# Patient Record
Sex: MALE | URBAN - METROPOLITAN AREA
[De-identification: names, ages, dates, MRNs, and addresses within clinical notes are randomized per-mention and may not be internally consistent; named-entity substitution may affect disease eponyms.]

---

## 2022-12-21 ENCOUNTER — OFFICE (OUTPATIENT)
Dept: URBAN - METROPOLITAN AREA CLINIC 6 | Facility: CLINIC | Age: 52
Setting detail: OPHTHALMOLOGY
End: 2022-12-21
Payer: COMMERCIAL

## 2022-12-21 DIAGNOSIS — H01.005: ICD-10-CM

## 2022-12-21 DIAGNOSIS — H01.002: ICD-10-CM

## 2022-12-21 DIAGNOSIS — H01.001: ICD-10-CM

## 2022-12-21 DIAGNOSIS — H25.13: ICD-10-CM

## 2022-12-21 DIAGNOSIS — H52.03: ICD-10-CM

## 2022-12-21 DIAGNOSIS — H01.004: ICD-10-CM

## 2022-12-21 PROCEDURE — 92014 COMPRE OPH EXAM EST PT 1/>: CPT | Performed by: OPHTHALMOLOGY

## 2022-12-21 PROCEDURE — 92015 DETERMINE REFRACTIVE STATE: CPT | Performed by: OPHTHALMOLOGY

## 2022-12-21 ASSESSMENT — KERATOMETRY
OD_K2POWER_DIOPTERS: 44.00
OS_K1POWER_DIOPTERS: 42.00
OD_K1POWER_DIOPTERS: 40.00
OD_AXISANGLE_DEGREES: 002
OS_K2POWER_DIOPTERS: 44.50
OS_AXISANGLE_DEGREES: 001

## 2022-12-21 ASSESSMENT — REFRACTION_MANIFEST
OD_CYLINDER: -0.25
OD_AXIS: 100
OS_VA1: 20/25
OS_SPHERE: +2.00
OD_SPHERE: PLANO
OU_VA: 20/20-1
OD_VA1: 20/25
OS_CYLINDER: -2.50
OS_AXIS: 095

## 2022-12-21 ASSESSMENT — LID EXAM ASSESSMENTS
OD_BLEPHARITIS: RLL RUL
OS_BLEPHARITIS: LLL LUL

## 2022-12-21 ASSESSMENT — SPHEQUIV_DERIVED
OS_SPHEQUIV: 0.75
OS_SPHEQUIV: 1
OD_SPHEQUIV: -0.125

## 2022-12-21 ASSESSMENT — REFRACTION_CURRENTRX
OS_OVR_VA: 20/
OD_CYLINDER: -2.25
OD_SPHERE: +1.50
OS_VPRISM_DIRECTION: SV
OS_SPHERE: +1.50
OS_AXIS: 81
OD_VPRISM_DIRECTION: SV
OD_OVR_VA: 20/
OD_AXIS: 65
OS_CYLINDER: -2.50

## 2022-12-21 ASSESSMENT — AXIALLENGTH_DERIVED
OS_AL: 23.2978
OS_AL: 23.3932
OD_AL: 24.2072

## 2022-12-21 ASSESSMENT — REFRACTION_AUTOREFRACTION
OD_SPHERE: 0.00
OS_SPHERE: +2.50
OS_AXIS: 093
OD_AXIS: 100
OD_CYLINDER: -0.25
OS_CYLINDER: -3.00

## 2022-12-21 ASSESSMENT — CONFRONTATIONAL VISUAL FIELD TEST (CVF)
OD_FINDINGS: FULL
OS_FINDINGS: FULL

## 2022-12-21 ASSESSMENT — TONOMETRY
OS_IOP_MMHG: 17
OD_IOP_MMHG: 17

## 2022-12-21 ASSESSMENT — VISUAL ACUITY
OS_BCVA: 20/25-
OD_BCVA: 20/30-

## 2023-10-24 ENCOUNTER — NON-APPOINTMENT (OUTPATIENT)
Age: 53
End: 2023-10-24

## 2023-11-13 ENCOUNTER — NON-APPOINTMENT (OUTPATIENT)
Age: 53
End: 2023-11-13

## 2023-12-08 PROBLEM — Z00.00 ENCOUNTER FOR PREVENTIVE HEALTH EXAMINATION: Status: ACTIVE | Noted: 2023-12-08

## 2023-12-14 ENCOUNTER — NON-APPOINTMENT (OUTPATIENT)
Age: 53
End: 2023-12-14

## 2023-12-14 ENCOUNTER — APPOINTMENT (OUTPATIENT)
Dept: CARDIOLOGY | Facility: CLINIC | Age: 53
End: 2023-12-14
Payer: COMMERCIAL

## 2023-12-14 VITALS
WEIGHT: 193 LBS | HEART RATE: 52 BPM | SYSTOLIC BLOOD PRESSURE: 134 MMHG | BODY MASS INDEX: 28.58 KG/M2 | HEIGHT: 69 IN | DIASTOLIC BLOOD PRESSURE: 80 MMHG | OXYGEN SATURATION: 98 % | RESPIRATION RATE: 16 BRPM

## 2023-12-14 DIAGNOSIS — Z87.19 PERSONAL HISTORY OF OTHER DISEASES OF THE DIGESTIVE SYSTEM: ICD-10-CM

## 2023-12-14 DIAGNOSIS — Z78.9 OTHER SPECIFIED HEALTH STATUS: ICD-10-CM

## 2023-12-14 DIAGNOSIS — Z87.891 PERSONAL HISTORY OF NICOTINE DEPENDENCE: ICD-10-CM

## 2023-12-14 DIAGNOSIS — R00.0 TACHYCARDIA, UNSPECIFIED: ICD-10-CM

## 2023-12-14 PROCEDURE — 99204 OFFICE O/P NEW MOD 45 MIN: CPT | Mod: 25

## 2023-12-14 PROCEDURE — 93000 ELECTROCARDIOGRAM COMPLETE: CPT

## 2023-12-14 NOTE — ASSESSMENT
[FreeTextEntry1] : 53M former smoker, who comes to the office to establish cardiovascular care.  Patient reports that for the past few months he has experienced palpitations at night time, these episodes last for about a couple of mins and improved on their own, he denies relationship with energy drinks and they are not associated with chest pain/SOB. His palpitations occurred about 2-3 times per week.  He denies syncope, no HF symptoms.    #Palpitations MCOT X 2weeks TTE  #Hx of smoking  will proceed with KATERIN/PVR to screen for PVD  #CAD screening  CAC score  RTC post testing  I appreciate the opportunity of working with you in the care of DENNIS SULLIVAN . If I may be of additional assistance, please do not hesitate to contact me.

## 2023-12-14 NOTE — HISTORY OF PRESENT ILLNESS
[FreeTextEntry1] : 53M former smoker, who comes to the office to establish cardiovascular care.  Patient reports that for the past few months he has experienced palpitations at night time, these episodes last for about a couple of mins and improved on their own, he denies relationship with energy drinks and they are not associated with chest pain/SOB. His palpitations occurred about 2-3 times per week.  He denies syncope, no HF symptoms.

## 2024-01-26 ENCOUNTER — APPOINTMENT (OUTPATIENT)
Dept: CARDIOLOGY | Facility: CLINIC | Age: 54
End: 2024-01-26
Payer: COMMERCIAL

## 2024-01-26 PROCEDURE — 93306 TTE W/DOPPLER COMPLETE: CPT

## 2024-01-26 PROCEDURE — 93923 UPR/LXTR ART STDY 3+ LVLS: CPT

## 2024-02-09 ENCOUNTER — APPOINTMENT (OUTPATIENT)
Dept: CARDIOLOGY | Facility: CLINIC | Age: 54
End: 2024-02-09
Payer: COMMERCIAL

## 2024-02-09 VITALS
RESPIRATION RATE: 16 BRPM | BODY MASS INDEX: 27.99 KG/M2 | WEIGHT: 189 LBS | SYSTOLIC BLOOD PRESSURE: 142 MMHG | DIASTOLIC BLOOD PRESSURE: 69 MMHG | HEIGHT: 69 IN | HEART RATE: 56 BPM

## 2024-02-09 DIAGNOSIS — Z87.891 PERSONAL HISTORY OF NICOTINE DEPENDENCE: ICD-10-CM

## 2024-02-09 PROCEDURE — 99213 OFFICE O/P EST LOW 20 MIN: CPT

## 2024-02-09 NOTE — CARDIOLOGY SUMMARY
[de-identified] : 12/14/23: Sinus bradycardia. [de-identified] :  HERB 12/26/23 - 1/8/24 No AFib, No SVT, no heart blocks, No Pauses, no VT,  no PAC/PVC [de-identified] : TTE 1/26/24: 1. Normal biventricular systolic function. 2. The right atrium is dilated in size. 3. No significant valvular disease. [de-identified] : Normal KATERIN/ PVR (1/26/24)

## 2024-02-09 NOTE — HISTORY OF PRESENT ILLNESS
[FreeTextEntry1] : 53M former smoker, who comes to the office to establish cardiovascular care.  On last visit patient was complaining of palpitations, he was ordered, MCOT. TTE.  KATERIN+ CAC score ordered for prevention purposes. Patient denies chest pain, no palpitations, no ALCANTARA, no PND, no orthopnea, no leg edema,  no claudication, no syncope.

## 2024-02-09 NOTE — ASSESSMENT
[FreeTextEntry1] : 53M former smoker, who comes to the office to establish cardiovascular care.  On last visit patient was complaining of palpitations, he was ordered, MCOT. TTE.  KATERIN+ CAC score ordered for prevention purposes. Patient denies chest pain, no palpitations, no ALCANTARA, no PND, no orthopnea, no leg edema,  no claudication, no syncope.   #Palpitations MCOT 12/26/23 - 1/8/24 No AFib, No SVT, no heart blocks, No Pauses, no VT,  no PAC/PVC unremarkable TTE  #Hx of smoking  normal KATERIN/PVR   #CAD screening  CAC score denied by insurance, will proceed with exercise stress test.   RTC post testing

## 2024-03-20 ENCOUNTER — APPOINTMENT (OUTPATIENT)
Dept: CARDIOLOGY | Facility: CLINIC | Age: 54
End: 2024-03-20
Payer: COMMERCIAL

## 2024-03-20 PROCEDURE — 93015 CV STRESS TEST SUPVJ I&R: CPT

## 2024-04-25 ENCOUNTER — APPOINTMENT (OUTPATIENT)
Dept: CARDIOLOGY | Facility: CLINIC | Age: 54
End: 2024-04-25
Payer: COMMERCIAL

## 2024-04-25 VITALS
OXYGEN SATURATION: 93 % | BODY MASS INDEX: 28.73 KG/M2 | SYSTOLIC BLOOD PRESSURE: 110 MMHG | HEIGHT: 69 IN | WEIGHT: 194 LBS | DIASTOLIC BLOOD PRESSURE: 60 MMHG | RESPIRATION RATE: 16 BRPM | HEART RATE: 63 BPM

## 2024-04-25 DIAGNOSIS — Z13.6 ENCOUNTER FOR SCREENING FOR CARDIOVASCULAR DISORDERS: ICD-10-CM

## 2024-04-25 DIAGNOSIS — R00.2 PALPITATIONS: ICD-10-CM

## 2024-04-25 PROCEDURE — 99213 OFFICE O/P EST LOW 20 MIN: CPT

## 2024-08-30 ENCOUNTER — APPOINTMENT (OUTPATIENT)
Dept: ORTHOPEDIC SURGERY | Facility: CLINIC | Age: 54
End: 2024-08-30

## 2024-08-31 ENCOUNTER — NON-APPOINTMENT (OUTPATIENT)
Age: 54
End: 2024-08-31

## 2024-09-09 ENCOUNTER — APPOINTMENT (OUTPATIENT)
Dept: ORTHOPEDIC SURGERY | Facility: CLINIC | Age: 54
End: 2024-09-09
Payer: COMMERCIAL

## 2024-09-09 VITALS
HEIGHT: 69 IN | WEIGHT: 194 LBS | BODY MASS INDEX: 28.73 KG/M2 | DIASTOLIC BLOOD PRESSURE: 73 MMHG | SYSTOLIC BLOOD PRESSURE: 166 MMHG

## 2024-09-09 DIAGNOSIS — M47.816 SPONDYLOSIS W/OUT MYELOPATHY OR RADICULOPATHY, LUMBAR REGION: ICD-10-CM

## 2024-09-09 DIAGNOSIS — M54.16 RADICULOPATHY, LUMBAR REGION: ICD-10-CM

## 2024-09-09 DIAGNOSIS — M16.12 UNILATERAL PRIMARY OSTEOARTHRITIS, LEFT HIP: ICD-10-CM

## 2024-09-09 PROCEDURE — 99204 OFFICE O/P NEW MOD 45 MIN: CPT

## 2024-09-09 PROCEDURE — 72110 X-RAY EXAM L-2 SPINE 4/>VWS: CPT

## 2024-09-09 RX ORDER — DICLOFENAC SODIUM 75 MG/1
75 TABLET, DELAYED RELEASE ORAL
Qty: 60 | Refills: 1 | Status: ACTIVE | COMMUNITY
Start: 2024-09-09 | End: 1900-01-01

## 2024-09-09 RX ORDER — GABAPENTIN 300 MG/1
300 CAPSULE ORAL
Qty: 30 | Refills: 1 | Status: ACTIVE | COMMUNITY
Start: 2024-09-09 | End: 1900-01-01

## 2024-09-10 RX ORDER — METHYLPREDNISOLONE 4 MG/1
4 TABLET ORAL
Qty: 1 | Refills: 1 | Status: ACTIVE | COMMUNITY
Start: 2024-09-10 | End: 1900-01-01

## 2024-09-10 NOTE — DISCUSSION/SUMMARY
[de-identified] : 54-year-old male with left hip osteoarthritis versus left lower extremity lumbar radiculopathy  Gabapentin 300 mg at nighttime Diclofenac 75 mg twice daily as needed for pain control Physical therapy [facility] service referral was placed to assist with scheduling and location I would like to see him back in 8 weeks if his symptoms have not improved we will consider more advanced imaging of his hip and/or lumbar spine

## 2024-09-10 NOTE — HISTORY OF PRESENT ILLNESS
[de-identified] :    - Summary: 54-year-old male, Shoaib, presents today with a complaint of low back pain and Left thigh/leg pain happening for the past couple of months.   - Chief Complaint (CC): Shoaib main concern today is low back pain that has been troubling him for the past couple of months.   - History of Present Illness: Shoaib, a 54-year-old male, presents with a primary complaint of low back pain over the past couple of months. The pain intensifies when lifting items, leading to a shooting pain down the left leg. He reports the pain is mostly on his left side and radiates down his left thigh, anterior thigh, accompanied by some numbness and tingling in anterior thigh.  He is also having some swelling of his LLE, recent doppler was negative for blood clot, having tightness and pain in back of his left calf. This discomfort sometimes makes walking and movements difficult, making him feel as if he's dragging his leg. The patient mentions a history of back problems and a recent increase in the severity of the pain. Notably, he has not undergone any recent medical interventions and does not currently take any medications.

## 2024-09-10 NOTE — HISTORY OF PRESENT ILLNESS
[de-identified] :    - Summary: 54-year-old male, Shoaib, presents today with a complaint of low back pain and Left thigh/leg pain happening for the past couple of months.   - Chief Complaint (CC): Shoaib main concern today is low back pain that has been troubling him for the past couple of months.   - History of Present Illness: Shoaib, a 54-year-old male, presents with a primary complaint of low back pain over the past couple of months. The pain intensifies when lifting items, leading to a shooting pain down the left leg. He reports the pain is mostly on his left side and radiates down his left thigh, anterior thigh, accompanied by some numbness and tingling in anterior thigh.  He is also having some swelling of his LLE, recent doppler was negative for blood clot, having tightness and pain in back of his left calf. This discomfort sometimes makes walking and movements difficult, making him feel as if he's dragging his leg. The patient mentions a history of back problems and a recent increase in the severity of the pain. Notably, he has not undergone any recent medical interventions and does not currently take any medications.

## 2024-09-10 NOTE — DISCUSSION/SUMMARY
[de-identified] : 54-year-old male with left hip osteoarthritis versus left lower extremity lumbar radiculopathy  Gabapentin 300 mg at nighttime Diclofenac 75 mg twice daily as needed for pain control Physical therapy [facility] service referral was placed to assist with scheduling and location I would like to see him back in 8 weeks if his symptoms have not improved we will consider more advanced imaging of his hip and/or lumbar spine

## 2024-09-10 NOTE — PHYSICAL EXAM
[de-identified] : CONSTITUTIONAL: Patient is a very pleasant individual who is well-nourished and appears stated age. PSYCHIATRIC: Alert and oriented times three and in no apparent distress, and participates with orthopedic evaluation well. HEAD: Atraumatic and nonsyndromic in appearance. EENT: No thyromegaly, EOMI. RESPIRATORY: Respiratory rate is regular, not dyspneic on examination. LYMPHATICS: There is no cervical or axillary lymphadenopathy. INTEGUMENTARY: Skin is clean, dry, and intact to bilateral lower extremities. VASCULAR: There is brisk capillary refill about the bilateral Lower Extremities with 2+ DP Pulse  Palpation: No tenderness to the lower lumbar spine There is minor discomfort with internal rotation of left hip  Muscle Strength Testing: Hip Flexion: 5/5 B/L Knee Extension: 5/5 B/L Knee Flexion: 5/5 B/L Dorsiflexion: 5/5 B/L EHL: 5/5 B/L Plantarflexion: 5/5 B/L  Sensation: SILT L2-S1 B/L except: Some numbness along anterior thigh  Reflexes: 2+ Quadriceps/Achilles  Gait: Normal gait w/o assistance Able to perform tandem gait Able to Heel Walk Able to Toe Walk  Special Testing:  Negative SLR BLLE Negative clonus BLLE  [de-identified] : Standing AP, lateral, flexion and extension radiographs of the lumbar spine performed on 9/9/2024 in the Radiology Department at [facility] for the indication of low back pain are reviewed.  These studies demonstrate right hip osteoarthritis moderate to severe bilateral hip osteoarthritis Lateral radiographs demonstrates multilevel lumbar spondylosis with disc height loss there is mild disc at loss at L3-4 and L4-5 moderate to severe at L5-S1 there is facet arthropathy from L3-S1

## 2024-09-10 NOTE — PHYSICAL EXAM
[de-identified] : CONSTITUTIONAL: Patient is a very pleasant individual who is well-nourished and appears stated age. PSYCHIATRIC: Alert and oriented times three and in no apparent distress, and participates with orthopedic evaluation well. HEAD: Atraumatic and nonsyndromic in appearance. EENT: No thyromegaly, EOMI. RESPIRATORY: Respiratory rate is regular, not dyspneic on examination. LYMPHATICS: There is no cervical or axillary lymphadenopathy. INTEGUMENTARY: Skin is clean, dry, and intact to bilateral lower extremities. VASCULAR: There is brisk capillary refill about the bilateral Lower Extremities with 2+ DP Pulse  Palpation: No tenderness to the lower lumbar spine There is minor discomfort with internal rotation of left hip  Muscle Strength Testing: Hip Flexion: 5/5 B/L Knee Extension: 5/5 B/L Knee Flexion: 5/5 B/L Dorsiflexion: 5/5 B/L EHL: 5/5 B/L Plantarflexion: 5/5 B/L  Sensation: SILT L2-S1 B/L except: Some numbness along anterior thigh  Reflexes: 2+ Quadriceps/Achilles  Gait: Normal gait w/o assistance Able to perform tandem gait Able to Heel Walk Able to Toe Walk  Special Testing:  Negative SLR BLLE Negative clonus BLLE  [de-identified] : Standing AP, lateral, flexion and extension radiographs of the lumbar spine performed on 9/9/2024 in the Radiology Department at [facility] for the indication of low back pain are reviewed.  These studies demonstrate right hip osteoarthritis moderate to severe bilateral hip osteoarthritis Lateral radiographs demonstrates multilevel lumbar spondylosis with disc height loss there is mild disc at loss at L3-4 and L4-5 moderate to severe at L5-S1 there is facet arthropathy from L3-S1

## 2025-01-20 DIAGNOSIS — I25.10 ATHEROSCLEROTIC HEART DISEASE OF NATIVE CORONARY ARTERY W/OUT ANGINA PECTORIS: ICD-10-CM

## 2025-01-22 PROBLEM — I25.10 CAD (CORONARY ARTERY DISEASE): Status: ACTIVE | Noted: 2025-01-22

## 2025-01-22 RX ORDER — ATORVASTATIN CALCIUM 80 MG/1
80 TABLET, FILM COATED ORAL
Qty: 30 | Refills: 11 | Status: ACTIVE | COMMUNITY
Start: 2025-01-22 | End: 1900-01-01

## 2025-01-22 RX ORDER — ASPIRIN 81 MG/1
81 TABLET, CHEWABLE ORAL DAILY
Qty: 30 | Refills: 6 | Status: ACTIVE | COMMUNITY
Start: 2025-01-22 | End: 1900-01-01

## 2025-01-23 ENCOUNTER — TRANSCRIPTION ENCOUNTER (OUTPATIENT)
Age: 55
End: 2025-01-23

## 2025-05-01 ENCOUNTER — APPOINTMENT (OUTPATIENT)
Dept: CARDIOLOGY | Facility: CLINIC | Age: 55
End: 2025-05-01

## 2025-07-17 ENCOUNTER — NON-APPOINTMENT (OUTPATIENT)
Age: 55
End: 2025-07-17